# Patient Record
Sex: MALE | Race: ASIAN | Employment: FULL TIME | URBAN - METROPOLITAN AREA
[De-identification: names, ages, dates, MRNs, and addresses within clinical notes are randomized per-mention and may not be internally consistent; named-entity substitution may affect disease eponyms.]

---

## 2017-03-04 PROCEDURE — 36415 COLL VENOUS BLD VENIPUNCTURE: CPT | Performed by: FAMILY MEDICINE

## 2017-03-04 PROCEDURE — 80053 COMPREHEN METABOLIC PANEL: CPT | Performed by: FAMILY MEDICINE

## 2017-03-04 PROCEDURE — 80061 LIPID PANEL: CPT | Performed by: FAMILY MEDICINE

## 2017-03-04 PROCEDURE — 85025 COMPLETE CBC W/AUTO DIFF WBC: CPT | Performed by: FAMILY MEDICINE

## 2021-06-04 PROBLEM — I77.9 ARTERIAL DISEASE (HCC): Status: ACTIVE | Noted: 2021-06-04

## 2021-06-24 ENCOUNTER — OFFICE VISIT (OUTPATIENT)
Dept: SURGERY | Facility: CLINIC | Age: 46
End: 2021-06-24
Payer: COMMERCIAL

## 2021-06-24 VITALS — SYSTOLIC BLOOD PRESSURE: 124 MMHG | DIASTOLIC BLOOD PRESSURE: 90 MMHG | HEART RATE: 62 BPM

## 2021-06-24 DIAGNOSIS — Z82.49 FAMILY HISTORY OF ANEURYSM: Primary | ICD-10-CM

## 2021-06-24 PROBLEM — R93.89 IMAGING ABNORMALITY: Status: ACTIVE | Noted: 2021-06-24

## 2021-06-24 PROCEDURE — 99203 OFFICE O/P NEW LOW 30 MIN: CPT | Performed by: RADIOLOGY

## 2021-06-24 PROCEDURE — 3080F DIAST BP >= 90 MM HG: CPT | Performed by: RADIOLOGY

## 2021-06-24 PROCEDURE — 3074F SYST BP LT 130 MM HG: CPT | Performed by: RADIOLOGY

## 2021-06-24 RX ORDER — ASPIRIN 81 MG/1
81 TABLET ORAL DAILY
COMMUNITY

## 2021-06-24 NOTE — PATIENT INSTRUCTIONS
Will get an MRA head in 3 months to confirm no aneurysm. Tele visit FU after MRA completed.      The patient will FU with PCP to manage atherosclerosis.

## 2021-06-24 NOTE — PROGRESS NOTES
[facility]  Interventional Neuroradiology Clinic Note        Neurology  [facility]    George Curtis MD  Primary Care      Date of Service: 6/24/2021    Dear Liza Phan,     We had the pleasure of seeing Richard Kaplan i mg total) by mouth daily. , Disp: 30 tablet, Rfl: 1  •  Fluticasone Propionate 50 MCG/ACT Nasal Suspension, 2 sprays in each nostril daily x1 week then 1-2 sprays in each nostril daily. (use smallest dose possible for symptom control after week 1). , Disp: , 4. Basal ganglia calcifications bilaterally. Please correlate clinically. Otherwise, unremarkable   noncontrast and contrast-enhanced CT head study.       Assessment/Plan:  Josue Smith is a right handed 39year old male who presents after having a

## 2021-06-24 NOTE — PROGRESS NOTES
Patient here for an evaluation with Dr. Marian Avelar.       Review of Systems:    Hand Dominance: right  General: no symptoms reported  Neuro: no symptoms reported  Head: vertigo  Musculoskeletal: no symptoms reported  Cardiovascular: no symptoms reported  [employer]

## 2021-09-24 ENCOUNTER — HOSPITAL ENCOUNTER (OUTPATIENT)
Dept: MRI IMAGING | Facility: HOSPITAL | Age: 46
Discharge: HOME | End: 2021-09-24
Attending: RADIOLOGY
Payer: COMMERCIAL

## 2021-09-24 DIAGNOSIS — Z82.49 FAMILY HISTORY OF ANEURYSM: ICD-10-CM

## 2021-09-24 PROCEDURE — 70544 MR ANGIOGRAPHY HEAD W/O DYE: CPT | Performed by: RADIOLOGY
